# Patient Record
Sex: FEMALE | Race: WHITE | NOT HISPANIC OR LATINO | URBAN - METROPOLITAN AREA
[De-identification: names, ages, dates, MRNs, and addresses within clinical notes are randomized per-mention and may not be internally consistent; named-entity substitution may affect disease eponyms.]

---

## 2024-02-04 ENCOUNTER — INPATIENT (INPATIENT)
Facility: HOSPITAL | Age: 89
LOS: 1 days | Discharge: HOME | DRG: 66 | End: 2024-02-06
Attending: STUDENT IN AN ORGANIZED HEALTH CARE EDUCATION/TRAINING PROGRAM | Admitting: STUDENT IN AN ORGANIZED HEALTH CARE EDUCATION/TRAINING PROGRAM
Payer: MEDICARE

## 2024-02-04 VITALS
WEIGHT: 100.09 LBS | TEMPERATURE: 98 F | SYSTOLIC BLOOD PRESSURE: 135 MMHG | HEIGHT: 60 IN | OXYGEN SATURATION: 93 % | HEART RATE: 83 BPM | DIASTOLIC BLOOD PRESSURE: 79 MMHG | RESPIRATION RATE: 18 BRPM

## 2024-02-04 DIAGNOSIS — R41.0 DISORIENTATION, UNSPECIFIED: ICD-10-CM

## 2024-02-04 LAB
ALBUMIN SERPL ELPH-MCNC: 3.8 G/DL — SIGNIFICANT CHANGE UP (ref 3.5–5.2)
ALP SERPL-CCNC: 62 U/L — SIGNIFICANT CHANGE UP (ref 30–115)
ALT FLD-CCNC: 12 U/L — SIGNIFICANT CHANGE UP (ref 0–41)
ANION GAP SERPL CALC-SCNC: 11 MMOL/L — SIGNIFICANT CHANGE UP (ref 7–14)
APPEARANCE UR: CLEAR — SIGNIFICANT CHANGE UP
APTT BLD: 28.5 SEC — SIGNIFICANT CHANGE UP (ref 27–39.2)
AST SERPL-CCNC: 15 U/L — SIGNIFICANT CHANGE UP (ref 0–41)
BASOPHILS # BLD AUTO: 0.06 K/UL — SIGNIFICANT CHANGE UP (ref 0–0.2)
BASOPHILS NFR BLD AUTO: 1 % — SIGNIFICANT CHANGE UP (ref 0–1)
BILIRUB SERPL-MCNC: 0.2 MG/DL — SIGNIFICANT CHANGE UP (ref 0.2–1.2)
BILIRUB UR-MCNC: NEGATIVE — SIGNIFICANT CHANGE UP
BUN SERPL-MCNC: 25 MG/DL — HIGH (ref 10–20)
CALCIUM SERPL-MCNC: 9.2 MG/DL — SIGNIFICANT CHANGE UP (ref 8.4–10.5)
CHLORIDE SERPL-SCNC: 109 MMOL/L — SIGNIFICANT CHANGE UP (ref 98–110)
CK MB CFR SERPL CALC: 2.1 NG/ML — SIGNIFICANT CHANGE UP (ref 0.6–6.3)
CO2 SERPL-SCNC: 24 MMOL/L — SIGNIFICANT CHANGE UP (ref 17–32)
COLOR SPEC: YELLOW — SIGNIFICANT CHANGE UP
CREAT SERPL-MCNC: 0.8 MG/DL — SIGNIFICANT CHANGE UP (ref 0.7–1.5)
DIFF PNL FLD: NEGATIVE — SIGNIFICANT CHANGE UP
EGFR: 67 ML/MIN/1.73M2 — SIGNIFICANT CHANGE UP
EOSINOPHIL # BLD AUTO: 0.19 K/UL — SIGNIFICANT CHANGE UP (ref 0–0.7)
EOSINOPHIL NFR BLD AUTO: 3.1 % — SIGNIFICANT CHANGE UP (ref 0–8)
GAS PNL BLDV: SIGNIFICANT CHANGE UP
GLUCOSE SERPL-MCNC: 95 MG/DL — SIGNIFICANT CHANGE UP (ref 70–99)
GLUCOSE UR QL: NEGATIVE MG/DL — SIGNIFICANT CHANGE UP
HCT VFR BLD CALC: 31.2 % — LOW (ref 37–47)
HGB BLD-MCNC: 8.8 G/DL — LOW (ref 12–16)
IMM GRANULOCYTES NFR BLD AUTO: 0.5 % — HIGH (ref 0.1–0.3)
INR BLD: 0.97 RATIO — SIGNIFICANT CHANGE UP (ref 0.65–1.3)
KETONES UR-MCNC: NEGATIVE MG/DL — SIGNIFICANT CHANGE UP
LEUKOCYTE ESTERASE UR-ACNC: ABNORMAL
LYMPHOCYTES # BLD AUTO: 1.31 K/UL — SIGNIFICANT CHANGE UP (ref 1.2–3.4)
LYMPHOCYTES # BLD AUTO: 21.1 % — SIGNIFICANT CHANGE UP (ref 20.5–51.1)
MAGNESIUM SERPL-MCNC: 2.1 MG/DL — SIGNIFICANT CHANGE UP (ref 1.8–2.4)
MCHC RBC-ENTMCNC: 21.5 PG — LOW (ref 27–31)
MCHC RBC-ENTMCNC: 28.2 G/DL — LOW (ref 32–37)
MCV RBC AUTO: 76.1 FL — LOW (ref 81–99)
MONOCYTES # BLD AUTO: 0.37 K/UL — SIGNIFICANT CHANGE UP (ref 0.1–0.6)
MONOCYTES NFR BLD AUTO: 5.9 % — SIGNIFICANT CHANGE UP (ref 1.7–9.3)
NEUTROPHILS # BLD AUTO: 4.26 K/UL — SIGNIFICANT CHANGE UP (ref 1.4–6.5)
NEUTROPHILS NFR BLD AUTO: 68.4 % — SIGNIFICANT CHANGE UP (ref 42.2–75.2)
NITRITE UR-MCNC: NEGATIVE — SIGNIFICANT CHANGE UP
NRBC # BLD: 0 /100 WBCS — SIGNIFICANT CHANGE UP (ref 0–0)
PH UR: 5.5 — SIGNIFICANT CHANGE UP (ref 5–8)
PLATELET # BLD AUTO: 360 K/UL — SIGNIFICANT CHANGE UP (ref 130–400)
PMV BLD: 9.5 FL — SIGNIFICANT CHANGE UP (ref 7.4–10.4)
POTASSIUM SERPL-MCNC: 4.3 MMOL/L — SIGNIFICANT CHANGE UP (ref 3.5–5)
POTASSIUM SERPL-SCNC: 4.3 MMOL/L — SIGNIFICANT CHANGE UP (ref 3.5–5)
PROT SERPL-MCNC: 6.2 G/DL — SIGNIFICANT CHANGE UP (ref 6–8)
PROT UR-MCNC: SIGNIFICANT CHANGE UP MG/DL
PROTHROM AB SERPL-ACNC: 11.1 SEC — SIGNIFICANT CHANGE UP (ref 9.95–12.87)
RBC # BLD: 4.1 M/UL — LOW (ref 4.2–5.4)
RBC # FLD: 17.5 % — HIGH (ref 11.5–14.5)
SODIUM SERPL-SCNC: 144 MMOL/L — SIGNIFICANT CHANGE UP (ref 135–146)
SP GR SPEC: 1.02 — SIGNIFICANT CHANGE UP (ref 1–1.03)
TROPONIN T, HIGH SENSITIVITY RESULT: 36 NG/L — HIGH (ref 6–13)
TROPONIN T, HIGH SENSITIVITY RESULT: 37 NG/L — HIGH (ref 6–13)
UROBILINOGEN FLD QL: 0.2 MG/DL — SIGNIFICANT CHANGE UP (ref 0.2–1)
WBC # BLD: 6.22 K/UL — SIGNIFICANT CHANGE UP (ref 4.8–10.8)
WBC # FLD AUTO: 6.22 K/UL — SIGNIFICANT CHANGE UP (ref 4.8–10.8)

## 2024-02-04 PROCEDURE — 70450 CT HEAD/BRAIN W/O DYE: CPT | Mod: 26,MA

## 2024-02-04 PROCEDURE — 80053 COMPREHEN METABOLIC PANEL: CPT

## 2024-02-04 PROCEDURE — 83735 ASSAY OF MAGNESIUM: CPT

## 2024-02-04 PROCEDURE — 82728 ASSAY OF FERRITIN: CPT

## 2024-02-04 PROCEDURE — 85730 THROMBOPLASTIN TIME PARTIAL: CPT

## 2024-02-04 PROCEDURE — 82746 ASSAY OF FOLIC ACID SERUM: CPT

## 2024-02-04 PROCEDURE — 92507 TX SP LANG VOICE COMM INDIV: CPT | Mod: GN

## 2024-02-04 PROCEDURE — 99285 EMERGENCY DEPT VISIT HI MDM: CPT | Mod: FS

## 2024-02-04 PROCEDURE — 93306 TTE W/DOPPLER COMPLETE: CPT

## 2024-02-04 PROCEDURE — A9579: CPT

## 2024-02-04 PROCEDURE — 86900 BLOOD TYPING SEROLOGIC ABO: CPT

## 2024-02-04 PROCEDURE — 84443 ASSAY THYROID STIM HORMONE: CPT

## 2024-02-04 PROCEDURE — 83550 IRON BINDING TEST: CPT

## 2024-02-04 PROCEDURE — 83540 ASSAY OF IRON: CPT

## 2024-02-04 PROCEDURE — 80061 LIPID PANEL: CPT

## 2024-02-04 PROCEDURE — 82553 CREATINE MB FRACTION: CPT

## 2024-02-04 PROCEDURE — 71045 X-RAY EXAM CHEST 1 VIEW: CPT | Mod: 26

## 2024-02-04 PROCEDURE — 85610 PROTHROMBIN TIME: CPT

## 2024-02-04 PROCEDURE — 83036 HEMOGLOBIN GLYCOSYLATED A1C: CPT

## 2024-02-04 PROCEDURE — 84484 ASSAY OF TROPONIN QUANT: CPT

## 2024-02-04 PROCEDURE — 86901 BLOOD TYPING SEROLOGIC RH(D): CPT

## 2024-02-04 PROCEDURE — 36415 COLL VENOUS BLD VENIPUNCTURE: CPT

## 2024-02-04 PROCEDURE — 93010 ELECTROCARDIOGRAM REPORT: CPT | Mod: 77

## 2024-02-04 PROCEDURE — 85025 COMPLETE CBC W/AUTO DIFF WBC: CPT

## 2024-02-04 PROCEDURE — 82607 VITAMIN B-12: CPT

## 2024-02-04 PROCEDURE — 70496 CT ANGIOGRAPHY HEAD: CPT

## 2024-02-04 PROCEDURE — 97162 PT EVAL MOD COMPLEX 30 MIN: CPT | Mod: GP

## 2024-02-04 PROCEDURE — 92610 EVALUATE SWALLOWING FUNCTION: CPT | Mod: GN

## 2024-02-04 PROCEDURE — 70553 MRI BRAIN STEM W/O & W/DYE: CPT

## 2024-02-04 PROCEDURE — 93970 EXTREMITY STUDY: CPT

## 2024-02-04 PROCEDURE — 99223 1ST HOSP IP/OBS HIGH 75: CPT

## 2024-02-04 PROCEDURE — 86850 RBC ANTIBODY SCREEN: CPT

## 2024-02-04 PROCEDURE — 70498 CT ANGIOGRAPHY NECK: CPT

## 2024-02-04 RX ORDER — METOPROLOL TARTRATE 50 MG
25 TABLET ORAL DAILY
Refills: 0 | Status: DISCONTINUED | OUTPATIENT
Start: 2024-02-04 | End: 2024-02-06

## 2024-02-04 RX ORDER — ENOXAPARIN SODIUM 100 MG/ML
40 INJECTION SUBCUTANEOUS EVERY 24 HOURS
Refills: 0 | Status: DISCONTINUED | OUTPATIENT
Start: 2024-02-04 | End: 2024-02-04

## 2024-02-04 RX ORDER — METOPROLOL TARTRATE 50 MG
1 TABLET ORAL
Refills: 0 | DISCHARGE

## 2024-02-04 RX ORDER — MULTIVIT-MIN/FERROUS GLUCONATE 9 MG/15 ML
1 LIQUID (ML) ORAL DAILY
Refills: 0 | Status: DISCONTINUED | OUTPATIENT
Start: 2024-02-04 | End: 2024-02-06

## 2024-02-04 RX ORDER — ENOXAPARIN SODIUM 100 MG/ML
30 INJECTION SUBCUTANEOUS EVERY 24 HOURS
Refills: 0 | Status: DISCONTINUED | OUTPATIENT
Start: 2024-02-04 | End: 2024-02-06

## 2024-02-04 RX ORDER — LANOLIN ALCOHOL/MO/W.PET/CERES
3 CREAM (GRAM) TOPICAL AT BEDTIME
Refills: 0 | Status: DISCONTINUED | OUTPATIENT
Start: 2024-02-04 | End: 2024-02-06

## 2024-02-04 RX ORDER — ONDANSETRON 8 MG/1
4 TABLET, FILM COATED ORAL EVERY 8 HOURS
Refills: 0 | Status: DISCONTINUED | OUTPATIENT
Start: 2024-02-04 | End: 2024-02-06

## 2024-02-04 RX ORDER — MULTIVIT-MIN/FERROUS GLUCONATE 9 MG/15 ML
1 LIQUID (ML) ORAL
Refills: 0 | DISCHARGE

## 2024-02-04 RX ORDER — SODIUM CHLORIDE 9 MG/ML
1000 INJECTION INTRAMUSCULAR; INTRAVENOUS; SUBCUTANEOUS
Refills: 0 | Status: DISCONTINUED | OUTPATIENT
Start: 2024-02-04 | End: 2024-02-06

## 2024-02-04 RX ORDER — SODIUM CHLORIDE 9 MG/ML
500 INJECTION INTRAMUSCULAR; INTRAVENOUS; SUBCUTANEOUS ONCE
Refills: 0 | Status: COMPLETED | OUTPATIENT
Start: 2024-02-04 | End: 2024-02-04

## 2024-02-04 RX ORDER — AMLODIPINE BESYLATE 2.5 MG/1
1 TABLET ORAL
Refills: 0 | DISCHARGE

## 2024-02-04 RX ORDER — ACETAMINOPHEN 500 MG
650 TABLET ORAL EVERY 6 HOURS
Refills: 0 | Status: DISCONTINUED | OUTPATIENT
Start: 2024-02-04 | End: 2024-02-06

## 2024-02-04 RX ORDER — AMLODIPINE BESYLATE 2.5 MG/1
2.5 TABLET ORAL DAILY
Refills: 0 | Status: DISCONTINUED | OUTPATIENT
Start: 2024-02-04 | End: 2024-02-06

## 2024-02-04 RX ADMIN — SODIUM CHLORIDE 1000 MILLILITER(S): 9 INJECTION INTRAMUSCULAR; INTRAVENOUS; SUBCUTANEOUS at 16:59

## 2024-02-04 NOTE — ED ADULT NURSE NOTE - OBJECTIVE STATEMENT
Patient presents to ED for c/o incontinence and generalized weakness x2 days. Patient A&O x2. As per son, patient A&O x4 at baseline. Patient's son reports patient has recurrent UTI's.

## 2024-02-04 NOTE — ED PROVIDER NOTE - CLINICAL SUMMARY MEDICAL DECISION MAKING FREE TEXT BOX
Patient presented with worsening generalized weakness and altered mental status per son.  Patient has been on the functional decline over the past few months, worsening the past few days and is status post several falls.  During these falls, patient is not hitting her head and the son is catching her prior to falling.  Otherwise on arrival to ED, patient afebrile, hemodynamically stable, grossly neurovascularly intact, no evidence of trauma on exam.  EKG was obtained and showed nonspecific ST abnormalities, but no evidence of STEMI.  Obtained labs which showed mild anemia to 8.8, although no bleeding on exam, and otherwise were grossly unremarkable including no significant leukocytosis, signs of dehydration/CHARY, transaminitis or significant electrolyte abnormalities.  UA negative for infection.  Chest x-ray showed bilateral opacities, but no focal consolidations to suggest pneumonia.  CT of the head revealed a subacute 1 cm hypodensity in the right occipital cortex, but no intracranial hemorrhage or any other emergent pathologies.  Neurology was consulted and will follow, no emergent intervention at this time.  Given the above however, patient require admission for further management.  Son at bedside agreeable with plan.  Hemodynamically stable at time of admission.

## 2024-02-04 NOTE — ED PROVIDER NOTE - CONSIDERATION OF ADMISSION OBSERVATION
Patient with frequent falls, generalized weakness, worsening mental status compared to baseline, unable to be cared for at home, will require admission. Consideration of Admission/Observation

## 2024-02-04 NOTE — ED PROVIDER NOTE - PHYSICAL EXAMINATION
CONSTITUTIONAL: in no apparent distress.   HEAD: Normocephalic; atraumatic.   EYES: Pupils are round and reactive, extra-ocular muscles are intact. Eyelids are normal in appearance without swelling or lesions.   RESPIRATORY: No signs of respiratory distress. Lung sounds are clear in all lobes bilaterally without rales, rhonchi, or wheezes.  CARDIOVASCULAR: Regular rate and rhythm.   GI: Abdomen is soft, non-tender, and without distention. Bowel sounds are present and normoactive in all four quadrants. No masses are noted.   NEURO: A & O x 3. Normal speech. No focal deficit.  PSYCHOLOGICAL: Appropriate mood and affect. Good judgement and insight.

## 2024-02-04 NOTE — ED PROVIDER NOTE - PROGRESS NOTE DETAILS
Patient is admitted for confusion. Spoke with neurology over the phone regarding to the CT finding and neurology team will follow the patient after patient is admitted. Pt is aware of the plan and agrees. Pt has been endorsed to MAR.

## 2024-02-04 NOTE — H&P ADULT - ASSESSMENT
89 y/o F PMHx HTN, CVA no residual defects not on medications, asymmetric pupils, was brought in by son for evaluation. Admitted to medicine    #CTH Subacute vs chronic infarct  #Hx CVA  - No focal deficits, not on antiplatelets or statin  - Neurology eval    #Weakness  - No weakness on physical exam, no AMS, pt AAOx3, son reports she has been having trouble standing up - orthostatic?  - FU Orthostatics  - FU repeat trops  - IVF for 12 hrs   - PT consult    #HTN  - C/w home meds    # Misc  - DVT Prophylaxis: enoxaparin  - GI Prophylaxis: Not needed  - Diet: Diet, DASH/TLC  - Dispo: Acute

## 2024-02-04 NOTE — ED PROVIDER NOTE - OBJECTIVE STATEMENT
89-year-old female with past medical history of hypertension who was brought in by son for evaluation.  Per son, patient has been confused intermittently for the past 2 days, as patient would ask repeatedly what the date is.  Also endorses general weakness and almost fell multiple times at home.  States that patient has been treated for UTI multiple times for the past several months.  Denies fever, shortness breath, chest pain, nausea, vomiting, abdominal pain, and urinary symptoms.

## 2024-02-04 NOTE — H&P ADULT - HISTORY OF PRESENT ILLNESS
89 y/o F PMHx HTN, CVA no residual defects not on medications, asymmetric pupils, was brought in by son for evaluation.  Per son, patient has been confused intermittently for the past 2 days, as patient would ask repeatedly what the date is.  Also endorses general weakness and almost fell multiple times at home.  States that patient has been treated for UTI multiple times for the past several months.  Denies fever, shortness breath, chest pain, nausea, vomiting, abdominal pain, and urinary symptoms.    Vitals: /79 HR 83 RR 18 T98F sat 93% on RA    UA neg, Hgb 8.8 Trop 37    CTH No Cont  1.  About 1 cm hypodensity associated with the right occipital cortex and adjacent white matter, may reflect subacute to chronic infarction.    2.  Otherwise unremarkable study.      Admitted for work up

## 2024-02-04 NOTE — ED PROVIDER NOTE - DIFFERENTIAL DIAGNOSIS
AMS r/o infection vs intracranial pathology vs electrolyte abnormality vs dehydration vs anemia vs other metabolic derangement. Differential Diagnosis

## 2024-02-04 NOTE — H&P ADULT - ATTENDING COMMENTS
Patient seen and examined at bedside independently of the residents. I read the resident's note and agree with the plan with the additions and corrections as noted below. My note supersedes the resident's note.     REVIEW OF SYSTEMS:  negative except in HPI.     PMH:  HTN, CVA (no residual deficit), Asymmetric pupils    FHx: Reviewed. No fhx of asthma/copd, No fhx of liver and pulmonary disease. No fhx of hematological disorder.     Physical Exam:  GEN: No acute distress. Awake, Alert and oriented x 3.   Head: Atraumatic, Normocephalic.   Eye: PEERLA. No sclera icterus. EOMI. Asymmetric pupils.   ENT: Normal oropharynx, no thyromegaly, no mass, no lymphadenopathy.   LUNGS: Clear to auscultation bilaterally. No wheeze/rales/crackles.   HEART: Normal. S1/S2 present. RRR. No murmur/gallops.   ABD: Soft, non-tender, non-distended. Bowel sounds present.   EXT: LLE 1+ pitting edema. No erythema. No tenderness.  Integumentary: No rash, No sore, No petechia. + hard non tender right breast mass.   NEURO: CN III-XII intact. Strength: 5/5 b/l ULE. Sensory intact b/l ULE.     Vital Signs Last 24 Hrs  T(C): 36.8 (2024 00:58), Max: 36.8 (2024 14:29)  T(F): 98.3 (2024 00:58), Max: 98.3 (2024 00:58)  HR: 95 (2024 00:58) (79 - 95)  BP: 174/79 (2024 00:58) (135/79 - 174/79)  BP(mean): --  RR: 18 (2024 00:30) (18 - 18)  SpO2: 99% (2024 00:30) (93% - 99%)    Parameters below as of 2024 00:30  Patient On (Oxygen Delivery Method): room air      Please see the above notes for Labs and radiology.     Assessment and Plan:     89 yo F with hx of HTN, CVA (no residual deficit), Asymmetric pupils, brought in by Son for intermittent disorientation.     Intermittent confusion 2/2 CVA vs. brain met  - Currently patient is A & O x 3.   - CTH: About 1 cm hypodensity associated with the right occipital cortex and adjacent white matter, may reflect subacute to chronic infarction.  - will get MRI w/wo contrast   - check TSH, B12, folate   - check HbA1c and Lipid panel  - F/u Neurology.     Right breast mass   - patient reports that she started to notice right breast mass about 2-3 months ago and is getting bigger. Denied fever/pain/tenderness.   - Will get breast US.     Mild LLE swelling  - check duplex LE     HTN - c/w home med.    DVT ppx: Lovenox SC  GI ppx: not indicated.   Diet: DASH diet  Activity: as tolerated.      Date seen by the attendin2024  Total time spent: 75 minutes. Patient seen and examined at bedside independently of the residents. I read the resident's note and agree with the plan with the additions and corrections as noted below. My note supersedes the resident's note.     REVIEW OF SYSTEMS:  negative except in HPI.     PMH:  HTN, CVA (no residual deficit), Asymmetric pupils    FHx: Reviewed. No fhx of asthma/copd, No fhx of liver and pulmonary disease. No fhx of hematological disorder.     Physical Exam:  GEN: No acute distress. Awake, Alert and oriented x 3.   Head: Atraumatic, Normocephalic.   Eye: PEERLA. No sclera icterus. EOMI. Asymmetric pupils.   ENT: Normal oropharynx, no thyromegaly, no mass, no lymphadenopathy.   LUNGS: Clear to auscultation bilaterally. No wheeze/rales/crackles.   HEART: Normal. S1/S2 present. RRR. No murmur/gallops.   ABD: Soft, non-tender, non-distended. Bowel sounds present.   EXT: LLE 1+ pitting edema. No erythema. No tenderness.  Integumentary: No rash, No sore, No petechia. + hard non tender right breast mass.   NEURO: CN III-XII intact. Strength: 5/5 b/l ULE. Sensory intact b/l ULE.     Vital Signs Last 24 Hrs  T(C): 36.8 (2024 00:58), Max: 36.8 (2024 14:29)  T(F): 98.3 (2024 00:58), Max: 98.3 (2024 00:58)  HR: 95 (2024 00:58) (79 - 95)  BP: 174/79 (2024 00:58) (135/79 - 174/79)  BP(mean): --  RR: 18 (2024 00:30) (18 - 18)  SpO2: 99% (2024 00:30) (93% - 99%)    Parameters below as of 2024 00:30  Patient On (Oxygen Delivery Method): room air      Please see the above notes for Labs and radiology.     Assessment and Plan:     89 yo F with hx of HTN, CVA (no residual deficit), Asymmetric pupils, brought in by Son for intermittent disorientation.     Intermittent confusion 2/2 CVA vs. brain met  - Currently patient is A & O x 3.   - CTH: About 1 cm hypodensity associated with the right occipital cortex and adjacent white matter, may reflect subacute to chronic infarction.  - will get MRI w/wo contrast   - check TSH, B12, folate   - check HbA1c and Lipid panel  - F/u Neurology.     Right breast mass   - patient reports that she started to notice right breast mass about 2-3 months ago and is getting bigger. Denied fever/pain/tenderness.   - Will get breast US     Mild LLE swelling  - check duplex LE     HTN - c/w home med.    DVT ppx: Lovenox SC  GI ppx: not indicated.   Diet: DASH diet  Activity: as tolerated.      Date seen by the attendin2024  Total time spent: 75 minutes. Patient seen and examined at bedside independently of the residents. I read the resident's note and agree with the plan with the additions and corrections as noted below. My note supersedes the resident's note.     REVIEW OF SYSTEMS:  negative except in HPI.     PMH:  HTN, CVA (no residual deficit), Asymmetric pupils    FHx: Reviewed. No fhx of asthma/copd, No fhx of liver and pulmonary disease. No fhx of hematological disorder.     Physical Exam:  GEN: No acute distress. Awake, Alert and oriented x 3.   Head: Atraumatic, Normocephalic.   Eye: PEERLA. No sclera icterus. EOMI. Asymmetric pupils.   ENT: Normal oropharynx, no thyromegaly, no mass, no lymphadenopathy.   LUNGS: Clear to auscultation bilaterally. No wheeze/rales/crackles.   HEART: Normal. S1/S2 present. RRR. No murmur/gallops.   ABD: Soft, non-tender, non-distended. Bowel sounds present.   EXT: LLE 1+ pitting edema. No erythema. No tenderness.  Integumentary: No rash, No sore, No petechia. + hard non tender right breast mass.   NEURO: CN III-XII intact. Strength: 5/5 b/l ULE. Sensory intact b/l ULE.     Vital Signs Last 24 Hrs  T(C): 36.8 (2024 00:58), Max: 36.8 (2024 14:29)  T(F): 98.3 (2024 00:58), Max: 98.3 (2024 00:58)  HR: 95 (2024 00:58) (79 - 95)  BP: 174/79 (2024 00:58) (135/79 - 174/79)  BP(mean): --  RR: 18 (2024 00:30) (18 - 18)  SpO2: 99% (2024 00:30) (93% - 99%)    Parameters below as of 2024 00:30  Patient On (Oxygen Delivery Method): room air      Please see the above notes for Labs and radiology.     Assessment and Plan:     89 yo F with hx of HTN, CVA (no residual deficit), Asymmetric pupils, brought in by Son for intermittent disorientation.     Intermittent confusion 2/2 CVA vs. brain met  - Currently patient is A & O x 3.   - CTH: About 1 cm hypodensity associated with the right occipital cortex and adjacent white matter, may reflect subacute to chronic infarction.  - will get MRI w/wo contrast   - check TSH, B12, folate   - check HbA1c and Lipid panel  - F/u Neurology.     Right breast mass   - patient reports that she started to notice right breast mass about 2-3 months ago and is getting bigger. Denied fever/pain/tenderness.   - Will get breast US     Mild LLE swelling  - check duplex LE     Suspected Dysphagia/Aspiration   - patient reports coughing when drink water.   - will get speech and swallow eval.     HTN - c/w home med.    DVT ppx: Lovenox SC  GI ppx: not indicated.   Diet: DASH diet  Activity: as tolerated.      Date seen by the attendin2024  Total time spent: 75 minutes.

## 2024-02-04 NOTE — ED ADULT NURSE NOTE - NSFALLHARMRISKINTERV_ED_ALL_ED

## 2024-02-04 NOTE — H&P ADULT - NSHPPHYSICALEXAM_GEN_ALL_CORE
LOS:     VITALS:   T(C): 36.8 (02-04-24 @ 14:29), Max: 36.8 (02-04-24 @ 14:29)  HR: 83 (02-04-24 @ 14:29) (83 - 83)  BP: 135/79 (02-04-24 @ 14:29) (135/79 - 135/79)  RR: 18 (02-04-24 @ 14:29) (18 - 18)  SpO2: 93% (02-04-24 @ 14:29) (93% - 93%)    GENERAL: NAD, lying in bed comfortably  HEENT:  Atraumatic, Normocephalic  CHEST/LUNG: Clear to auscultation bilaterally. Unlabored respirations  HEART: Regular rate and rhythm  ABDOMEN: BSx4; Soft, nontender, nondistended  NERVOUS SYSTEM:  A&Ox3

## 2024-02-05 LAB
A1C WITH ESTIMATED AVERAGE GLUCOSE RESULT: 5.7 % — HIGH (ref 4–5.6)
ALBUMIN SERPL ELPH-MCNC: 3.6 G/DL — SIGNIFICANT CHANGE UP (ref 3.5–5.2)
ALP SERPL-CCNC: 57 U/L — SIGNIFICANT CHANGE UP (ref 30–115)
ALT FLD-CCNC: 10 U/L — SIGNIFICANT CHANGE UP (ref 0–41)
ANION GAP SERPL CALC-SCNC: 10 MMOL/L — SIGNIFICANT CHANGE UP (ref 7–14)
APTT BLD: 27.8 SEC — SIGNIFICANT CHANGE UP (ref 27–39.2)
AST SERPL-CCNC: 15 U/L — SIGNIFICANT CHANGE UP (ref 0–41)
BASOPHILS # BLD AUTO: 0.05 K/UL — SIGNIFICANT CHANGE UP (ref 0–0.2)
BASOPHILS NFR BLD AUTO: 0.7 % — SIGNIFICANT CHANGE UP (ref 0–1)
BILIRUB SERPL-MCNC: 0.3 MG/DL — SIGNIFICANT CHANGE UP (ref 0.2–1.2)
BUN SERPL-MCNC: 23 MG/DL — HIGH (ref 10–20)
CALCIUM SERPL-MCNC: 8.8 MG/DL — SIGNIFICANT CHANGE UP (ref 8.4–10.4)
CHLORIDE SERPL-SCNC: 108 MMOL/L — SIGNIFICANT CHANGE UP (ref 98–110)
CHOLEST SERPL-MCNC: 150 MG/DL — SIGNIFICANT CHANGE UP
CO2 SERPL-SCNC: 25 MMOL/L — SIGNIFICANT CHANGE UP (ref 17–32)
CREAT SERPL-MCNC: 0.7 MG/DL — SIGNIFICANT CHANGE UP (ref 0.7–1.5)
CULTURE RESULTS: SIGNIFICANT CHANGE UP
EGFR: 79 ML/MIN/1.73M2 — SIGNIFICANT CHANGE UP
EOSINOPHIL # BLD AUTO: 0.17 K/UL — SIGNIFICANT CHANGE UP (ref 0–0.7)
EOSINOPHIL NFR BLD AUTO: 2.3 % — SIGNIFICANT CHANGE UP (ref 0–8)
ESTIMATED AVERAGE GLUCOSE: 117 MG/DL — HIGH (ref 68–114)
FERRITIN SERPL-MCNC: 16 NG/ML — SIGNIFICANT CHANGE UP (ref 13–330)
FOLATE SERPL-MCNC: 16.3 NG/ML — SIGNIFICANT CHANGE UP
GLUCOSE SERPL-MCNC: 86 MG/DL — SIGNIFICANT CHANGE UP (ref 70–99)
HCT VFR BLD CALC: 28.8 % — LOW (ref 37–47)
HDLC SERPL-MCNC: 63 MG/DL — SIGNIFICANT CHANGE UP
HGB BLD-MCNC: 8.1 G/DL — LOW (ref 12–16)
IMM GRANULOCYTES NFR BLD AUTO: 0.3 % — SIGNIFICANT CHANGE UP (ref 0.1–0.3)
INR BLD: 1 RATIO — SIGNIFICANT CHANGE UP (ref 0.65–1.3)
IRON SATN MFR SERPL: 25 UG/DL — LOW (ref 35–150)
IRON SATN MFR SERPL: 9 % — LOW (ref 15–50)
LIPID PNL WITH DIRECT LDL SERPL: 76 MG/DL — SIGNIFICANT CHANGE UP
LYMPHOCYTES # BLD AUTO: 1.37 K/UL — SIGNIFICANT CHANGE UP (ref 1.2–3.4)
LYMPHOCYTES # BLD AUTO: 18.9 % — LOW (ref 20.5–51.1)
MAGNESIUM SERPL-MCNC: 1.9 MG/DL — SIGNIFICANT CHANGE UP (ref 1.8–2.4)
MCHC RBC-ENTMCNC: 21.5 PG — LOW (ref 27–31)
MCHC RBC-ENTMCNC: 28.1 G/DL — LOW (ref 32–37)
MCV RBC AUTO: 76.4 FL — LOW (ref 81–99)
MONOCYTES # BLD AUTO: 0.4 K/UL — SIGNIFICANT CHANGE UP (ref 0.1–0.6)
MONOCYTES NFR BLD AUTO: 5.5 % — SIGNIFICANT CHANGE UP (ref 1.7–9.3)
NEUTROPHILS # BLD AUTO: 5.23 K/UL — SIGNIFICANT CHANGE UP (ref 1.4–6.5)
NEUTROPHILS NFR BLD AUTO: 72.3 % — SIGNIFICANT CHANGE UP (ref 42.2–75.2)
NON HDL CHOLESTEROL: 87 MG/DL — SIGNIFICANT CHANGE UP
NRBC # BLD: 0 /100 WBCS — SIGNIFICANT CHANGE UP (ref 0–0)
PLATELET # BLD AUTO: 357 K/UL — SIGNIFICANT CHANGE UP (ref 130–400)
PMV BLD: 9.7 FL — SIGNIFICANT CHANGE UP (ref 7.4–10.4)
POTASSIUM SERPL-MCNC: 4.1 MMOL/L — SIGNIFICANT CHANGE UP (ref 3.5–5)
POTASSIUM SERPL-SCNC: 4.1 MMOL/L — SIGNIFICANT CHANGE UP (ref 3.5–5)
PROT SERPL-MCNC: 5.8 G/DL — LOW (ref 6–8)
PROTHROM AB SERPL-ACNC: 11.4 SEC — SIGNIFICANT CHANGE UP (ref 9.95–12.87)
RBC # BLD: 3.77 M/UL — LOW (ref 4.2–5.4)
RBC # FLD: 17.7 % — HIGH (ref 11.5–14.5)
SODIUM SERPL-SCNC: 143 MMOL/L — SIGNIFICANT CHANGE UP (ref 135–146)
SPECIMEN SOURCE: SIGNIFICANT CHANGE UP
TIBC SERPL-MCNC: 264 UG/DL — SIGNIFICANT CHANGE UP (ref 220–430)
TRIGL SERPL-MCNC: 53 MG/DL — SIGNIFICANT CHANGE UP
TSH SERPL-MCNC: 3.63 UIU/ML — SIGNIFICANT CHANGE UP (ref 0.27–4.2)
UIBC SERPL-MCNC: 239 UG/DL — SIGNIFICANT CHANGE UP (ref 110–370)
VIT B12 SERPL-MCNC: 719 PG/ML — SIGNIFICANT CHANGE UP (ref 232–1245)
WBC # BLD: 7.24 K/UL — SIGNIFICANT CHANGE UP (ref 4.8–10.8)
WBC # FLD AUTO: 7.24 K/UL — SIGNIFICANT CHANGE UP (ref 4.8–10.8)

## 2024-02-05 PROCEDURE — 99222 1ST HOSP IP/OBS MODERATE 55: CPT | Mod: GC

## 2024-02-05 PROCEDURE — 70496 CT ANGIOGRAPHY HEAD: CPT | Mod: 26

## 2024-02-05 PROCEDURE — 70553 MRI BRAIN STEM W/O & W/DYE: CPT | Mod: 26

## 2024-02-05 PROCEDURE — 99233 SBSQ HOSP IP/OBS HIGH 50: CPT

## 2024-02-05 PROCEDURE — 93970 EXTREMITY STUDY: CPT | Mod: 26

## 2024-02-05 PROCEDURE — 70498 CT ANGIOGRAPHY NECK: CPT | Mod: 26

## 2024-02-05 RX ORDER — ASPIRIN/CALCIUM CARB/MAGNESIUM 324 MG
81 TABLET ORAL DAILY
Refills: 0 | Status: DISCONTINUED | OUTPATIENT
Start: 2024-02-05 | End: 2024-02-06

## 2024-02-05 RX ORDER — ATORVASTATIN CALCIUM 80 MG/1
40 TABLET, FILM COATED ORAL AT BEDTIME
Refills: 0 | Status: DISCONTINUED | OUTPATIENT
Start: 2024-02-05 | End: 2024-02-06

## 2024-02-05 RX ADMIN — SODIUM CHLORIDE 50 MILLILITER(S): 9 INJECTION INTRAMUSCULAR; INTRAVENOUS; SUBCUTANEOUS at 05:35

## 2024-02-05 RX ADMIN — AMLODIPINE BESYLATE 2.5 MILLIGRAM(S): 2.5 TABLET ORAL at 05:23

## 2024-02-05 RX ADMIN — Medication 1 TABLET(S): at 15:16

## 2024-02-05 RX ADMIN — ENOXAPARIN SODIUM 30 MILLIGRAM(S): 100 INJECTION SUBCUTANEOUS at 15:21

## 2024-02-05 RX ADMIN — Medication 25 MILLIGRAM(S): at 05:23

## 2024-02-05 RX ADMIN — Medication 10 MILLIGRAM(S): at 05:22

## 2024-02-05 RX ADMIN — ATORVASTATIN CALCIUM 40 MILLIGRAM(S): 80 TABLET, FILM COATED ORAL at 22:30

## 2024-02-05 NOTE — CONSULT NOTE ADULT - SUBJECTIVE AND OBJECTIVE BOX
Neurology Consult    Patient is a 89y old  Female with PMH of HTN, stroke (about 3 years ago, with residual Rt arm ataxia), asymmetric pupils who presents with a chief complaint of Weakness (04 Feb 2024 19:28)  Patient was seen and examined at the bedside. the patient states that she was brought to the hospital by her son, after her PCP told him to do so with her being generally weak.   She denied vision loss, diplopia, speech disturbance, or vertigo. Pt denied headache , neck pain or any recent head or neck trauma. She stated that her Rt arm was abnormal since her  passed about 3y ago, and she was told that she had a stroke in another hospital.     HPI:  89 y/o F PMHx HTN, CVA no residual defects not on medications, asymmetric pupils, was brought in by son for evaluation.  Per son, patient has been confused intermittently for the past 2 days, as patient would ask repeatedly what the date is.  Also endorses general weakness and almost fell multiple times at home.  States that patient has been treated for UTI multiple times for the past several months.  Denies fever, shortness breath, chest pain, nausea, vomiting, abdominal pain, and urinary symptoms.    Vitals: /79 HR 83 RR 18 T98F sat 93% on RA    UA neg, Hgb 8.8 Trop 37    CTH No Cont  1.  About 1 cm hypodensity associated with the right occipital cortex and adjacent white matter, may reflect subacute to chronic infarction.    2.  Otherwise unremarkable study.      Admitted for work up (04 Feb 2024 19:28)      PAST MEDICAL & SURGICAL HISTORY:  HTN (hypertension)      Cerebrovascular accident (CVA)          FAMILY HISTORY:      Social History: (-) x 3    Allergies    No Known Allergies    Intolerances        MEDICATIONS  (STANDING):  amLODIPine   Tablet 2.5 milliGRAM(s) Oral daily  artificial tears (preservative free) Ophthalmic Solution 1 Drop(s) Both EYES two times a day  enalapril 10 milliGRAM(s) Oral daily  enoxaparin Injectable 30 milliGRAM(s) SubCutaneous every 24 hours  metoprolol succinate ER 25 milliGRAM(s) Oral daily  multivitamin/minerals 1 Tablet(s) Oral daily  sodium chloride 0.9%. 1000 milliLiter(s) (50 mL/Hr) IV Continuous <Continuous>    MEDICATIONS  (PRN):  acetaminophen     Tablet .. 650 milliGRAM(s) Oral every 6 hours PRN Temp greater or equal to 38C (100.4F), Mild Pain (1 - 3)  aluminum hydroxide/magnesium hydroxide/simethicone Suspension 30 milliLiter(s) Oral every 4 hours PRN Dyspepsia  melatonin 3 milliGRAM(s) Oral at bedtime PRN Insomnia  ondansetron Injectable 4 milliGRAM(s) IV Push every 8 hours PRN Nausea and/or Vomiting      Review of systems:    Constitutional: as per HPI  Eyes: No eye pain or discharge  ENMT:  No difficulty hearing; No sinus or throat pain  Neck: No pain or stiffness  Respiratory: No cough, wheezing, chills or hemoptysis  Cardiovascular: No chest pain, palpitations, shortness of breath, dyspnea on exertion  Gastrointestinal: No abdominal pain, nausea, vomiting or hematemesis; No diarrhea or constipation.   Genitourinary: No dysuria, frequency, hematuria or incontinence  Neurological: As per HPI  Skin: No rashes or lesions   Endocrine: No heat or cold intolerance; No hair loss  Musculoskeletal: No joint pain or swelling  Psychiatric: No depression, anxiety, mood swings  Heme/Lymph: No easy bruising or bleeding gums    Vital Signs Last 24 Hrs  T(C): 36.7 (05 Feb 2024 07:14), Max: 36.8 (04 Feb 2024 14:29)  T(F): 98.1 (05 Feb 2024 07:14), Max: 98.3 (05 Feb 2024 00:58)  HR: 82 (05 Feb 2024 07:14) (78 - 95)  BP: 130/65 (05 Feb 2024 07:14) (130/65 - 174/79)  BP(mean): --  RR: 18 (05 Feb 2024 07:14) (18 - 18)  SpO2: 98% (05 Feb 2024 07:14) (93% - 99%)    Parameters below as of 05 Feb 2024 07:14  Patient On (Oxygen Delivery Method): room air        Examination:  General:  Appearance is consistent with chronologic age.  No abnormal facies.  Gross skin survey within normal limits.    Cognitive/Language:  The patient is oriented to person, place, time and date (she was able to tell her full name, recognized me as a doctor, and recognized the nurse, she was able to tell her age, and the current month and year, she was able to tell she is in a hospital and the name of this hospital. Language with normal repetition, comprehension and naming.  Mild dysarthric (states it is old)  Eyes: Lt upper quadrantanopia.  EOMI w/o nystagmus, skew or reported double vision. Rt pupil, not reactive, irregular, and dilated 6-7 mm, Lt pupil reactive, regular 2-3 mm.  No ptosis/weakness of eyelid closure.    Face:  Facial sensation normal V1 - 3, no facial asymmetry.    Ears/Nose/Throat:  Hearing decreased b/l.  Palate elevates midline.  Tongue and uvula midline.   Motor examination: Normal tone, bulk and range of motion.  No tenderness, twitching, tremors or involuntary movements.  Formal Muscle Strength Testing: (MRC grade R/L) 5/5 UE; 5/5 LE.  No observable drift.  Reflexes: 1+ b/l biceps, triceps, brachioradialis, patella and absent Achilles.  Plantar response downgoing Rt, up going Lt.   Sensory examination: Intact to light touch and pinprick in all extremities.  Cerebellum: Ataxia and dysmetria on the Rt arm.    Gait deferred (she uses a walker at baseline)        Labs:   CBC Full  -  ( 05 Feb 2024 05:43 )  WBC Count : 7.24 K/uL  RBC Count : 3.77 M/uL  Hemoglobin : 8.1 g/dL  Hematocrit : 28.8 %  Platelet Count - Automated : 357 K/uL  Mean Cell Volume : 76.4 fL  Mean Cell Hemoglobin : 21.5 pg  Mean Cell Hemoglobin Concentration : 28.1 g/dL  Auto Neutrophil # : 5.23 K/uL  Auto Lymphocyte # : 1.37 K/uL  Auto Monocyte # : 0.40 K/uL  Auto Eosinophil # : 0.17 K/uL  Auto Basophil # : 0.05 K/uL  Auto Neutrophil % : 72.3 %  Auto Lymphocyte % : 18.9 %  Auto Monocyte % : 5.5 %  Auto Eosinophil % : 2.3 %  Auto Basophil % : 0.7 %    02-05    143  |  108  |  23<H>  ----------------------------<  86  4.1   |  25  |  0.7    Ca    8.8      05 Feb 2024 05:43  Mg     1.9     02-05    TPro  5.8<L>  /  Alb  3.6  /  TBili  0.3  /  DBili  x   /  AST  15  /  ALT  10  /  AlkPhos  57  02-05    LIVER FUNCTIONS - ( 05 Feb 2024 05:43 )  Alb: 3.6 g/dL / Pro: 5.8 g/dL / ALK PHOS: 57 U/L / ALT: 10 U/L / AST: 15 U/L / GGT: x           PT/INR - ( 05 Feb 2024 05:43 )   PT: 11.40 sec;   INR: 1.00 ratio         PTT - ( 05 Feb 2024 05:43 )  PTT:27.8 sec  Urinalysis Basic - ( 05 Feb 2024 05:43 )    Color: x / Appearance: x / SG: x / pH: x  Gluc: 86 mg/dL / Ketone: x  / Bili: x / Urobili: x   Blood: x / Protein: x / Nitrite: x   Leuk Esterase: x / RBC: x / WBC x   Sq Epi: x / Non Sq Epi: x / Bacteria: x          Neuroimaging:  NCHCT: CT Head No Cont:   ACC: [id number] EXAM:  CT BRAIN   ORDERED BY: JULISSA CERVANTES     PROCEDURE DATE:  02/04/2024          INTERPRETATION:  INDICATION: Confusion.    TECHNIQUE: CT of the head was performed without intravenous contrast.    Multiple contiguous axial images were acquired from the skull-base to the   vertex without the administration of intravenous contrast. Coronal and   sagittal reformations were made.    CORRELATION/COMPARISON: None    FINDINGS:    The ventricles and sulci are prominent, compatible with moderate cerebral   volume loss.    Focal calcific density noted in the region of the right sylvian fissure   cortex.    Focal hypodensity of the right occipital lobe (series 6, image 81).    There are no extra-axial fluid collections or hydrocephalus. There is no   evidence of acute intracranial hemorrhage, mass effect, or midline shift.    Partially opacified left sphenoid sinus and ethmoid air cell. The   remaining paranasal sinuses and mastoid complexes are clear. The   calvarium is intact.      IMPRESSION:    1.  About 1 cm hypodensity associated with the right occipital cortex and   adjacent white matter, may reflect subacute to chronic infarction.    2.  Otherwise unremarkable study.    --- End of Report ---          BENITA LÓPEZ MD; Resident Radiologist  This document has been electronically signed.  DARWIN THOMPSON MD; Attending Interventional Radiologist  This document has been electronically signed. Feb 4 2024  6:05PM (02-04-24 @ 16:31)      02-05-24 @ 10:03

## 2024-02-05 NOTE — SWALLOW BEDSIDE ASSESSMENT ADULT - NS SPL SWALLOW CLINIC TRIAL FT
oropharyngeal swallow is WFL for controlled sips of thin, puree and reg solids w/o overt s/s of penetration/aspiration. +min overt s/s of penetration/aspiration w/ consecutive sips of thin liquids

## 2024-02-05 NOTE — PHYSICAL THERAPY INITIAL EVALUATION ADULT - PERTINENT HX OF CURRENT PROBLEM, REHAB EVAL
89 y/o F PMHx HTN, CVA no residual defects not on medications, asymmetric pupils, was brought in by son for evaluation.  Per son, patient has been confused intermittently for the past 2 days, as patient would ask repeatedly what the date is.  Also endorses general weakness and almost fell multiple times at home.  States that patient has been treated for UTI multiple times for the past several months.  Denies fever, shortness breath, chest pain, nausea, vomiting, abdominal pain, and urinary symptoms.

## 2024-02-05 NOTE — PHYSICAL THERAPY INITIAL EVALUATION ADULT - ADDITIONAL COMMENTS
Pt. lives alone with no stairs to negotiate. Per son at bedside, pt. was ambulating functionally with rollator prior to admission and able to negotiate stairs as needed.

## 2024-02-05 NOTE — CONSULT NOTE ADULT - ATTENDING COMMENTS
Bihemispheric infarcts noted on MRI, most predominantly in the R occipital and frontal lobes and left ian. Start ASA/Plavix and high-intensity statin. Obtain vessel imaging and 2D ECHO. PT/OT/ST.

## 2024-02-05 NOTE — SWALLOW BEDSIDE ASSESSMENT ADULT - COMMENTS
Pt son reported h/o dysphagia , coughs w/ liquids at home, Was placed on "thickened liquids" in past by  at [facility], no reported instrumental swallow study. Pt did not like thickened liquids nor use of specialized metered drinking utensils (provale cup/metered drinking straw)

## 2024-02-05 NOTE — SWALLOW BEDSIDE ASSESSMENT ADULT - SLP PERTINENT HISTORY OF CURRENT PROBLEM
89 yo F with hx of HTN, CVA (no residual deficit), Asymmetric pupils, brought in by Son for intermittent disorientation and dysarthria r/o CVA. Sons states pt's mental status waxes and wanes CTH reviewed and interpreted: About 1 cm hypodensity associated with the right occipital cortex and adjacent white matter, may reflect subacute to chronic infarction

## 2024-02-05 NOTE — CONSULT NOTE ADULT - CONSULT REASON
1 cm hypodensity associated with the right occipital cortex and   adjacent white matter, may reflect subacute to chronic infarction

## 2024-02-05 NOTE — CONSULT NOTE ADULT - ASSESSMENT
This 89y old female with the above PMH, referred to neurovascular for " 1 cm hypodensity associated with the right occipital cortex and adjacent white matter, may reflect subacute to chronic infarction". CTH was reviewed and the mentioned lesion is most likely chronic and related to the patient previous stroke; however, she is not on treatment and her stroke risk factors are not evaluated      Recommendations:  - Start ASA 81mg PO daily (if not contraindications)  - Start Atorvastatin 40mg PO daily  - Check A1C  - Obtain MRI brain without  - Recommend SBP goal within normal   - Recommend q8hr stroke neuro checks  - Provide stroke education  - TTE with bubble study  - Keep on telemonitoring            Thank you for sharing this patient with me; please do not hesitate to contact me in case of any question.        This 89y old female with the above PMH, referred to neurovascular for " 1 cm hypodensity associated with the right occipital cortex and adjacent white matter, may reflect subacute to chronic infarction". CTH was reviewed and the mentioned lesion is most likely chronic and related to the patient previous stroke; however, she is not on treatment and her stroke risk factors are not evaluated      Recommendations:  - Start ASA 81mg PO daily (if not contraindications)  - Start Atorvastatin 40mg PO daily  - Check A1C  - Obtain MRI brain without  - Recommend SBP goal within normal   - Recommend q8hr stroke neuro checks  - Provide stroke education  - TTE   - CTA head and neck   - Keep on telemonitoring            Thank you for sharing this patient with me; please do not hesitate to contact me in case of any question.

## 2024-02-05 NOTE — PHYSICAL THERAPY INITIAL EVALUATION ADULT - LEVEL OF INDEPENDENCE: GAIT, REHAB EVAL
Lot # (Optional): [id number] Lot # (Optional): [id number] with occasional Min A to maintain balance/contact guard

## 2024-02-05 NOTE — PHYSICAL THERAPY INITIAL EVALUATION ADULT - GENERAL OBSERVATIONS, REHAB EVAL
14:05-14:40 Pt. encountered in semifowler in stretcher in NAD. +Weems catheter. Son at bedside. R UE dysmetria noted with finger-to-nose testing. Agreeable to PT. RN made aware.

## 2024-02-05 NOTE — PROGRESS NOTE ADULT - ASSESSMENT
89 yo F with hx of HTN, CVA (no residual deficit), Asymmetric pupils, brought in by Son for intermittent disorientation.     #Intermittent confusion   r/o CVA  Currently patient is A & O x 3. Sons states pt's mental status waxes and wanes  CTH reviewed and interpreted: About 1 cm hypodensity associated with the right occipital cortex and adjacent white matter, may reflect subacute to chronic infarction  dw neurology  - MRI w/wo contrast   - ASA/statin until imaginge  - check TSH, B12, folate   - check HbA1c and Lipid panel  - PT eval    #Right breast mass   patient reports that she started to notice right breast mass about 2-3 months ago and is getting bigger. Denied fever/pain/tenderness.   - Outpatient f/u, son informed    #Mild LLE swelling  - check duplex LE     #Suspected Dysphagia/Aspiration   - patient reports coughing when drink water.   - S/S eval pending    HTN   - metoprolol ER 25mg qD  - enalapril 10mg qD    DVT ppx: Lovenox SC    #Progress Note Handoff  Pending (specify): MR Head  Family discussion: dw son regarding eval for stroke  Disposition: Home 89 yo F with hx of HTN, CVA (no residual deficit), Asymmetric pupils, brought in by Son for intermittent disorientation.     #Intermittent confusion   #Acute/Subacute CVA  #Hx CVA  Currently patient is A & O x 3. Sons states pt's mental status waxes and wanes  CTH reviewed and interpreted: About 1 cm hypodensity associated with the right occipital cortex and adjacent white matter, may reflect subacute to chronic infarction  MR Head w/wo IV contrast showing small aculte/subacute infarct in inferior right occipital lobe wiht leptomeningeal enhancement; Additional small acute infarcts in the right frontal cortex and in the left ian  dw neurology  - Tele monitoring  - ASA/statin  - f/u Echocardiogram  - check TSH, B12, folate   - check HbA1c and Lipid panel  - PT eval    #Right breast mass   patient reports that she started to notice right breast mass about 2-3 months ago and is getting bigger. Denied fever/pain/tenderness.   - Outpatient f/u, son informed    #Mild LLE swelling  - check duplex LE     #Suspected Dysphagia/Aspiration   - patient reports coughing when drink water.   - S/S eval pending    HTN   - metoprolol ER 25mg qD  - enalapril 10mg qD    DVT ppx: Lovenox SC    #Progress Note Handoff  Pending (specify): Neurology f/u, PT Eval  Family discussion: dw son regarding MR findings  Disposition: Home

## 2024-02-06 VITALS
HEART RATE: 91 BPM | RESPIRATION RATE: 18 BRPM | SYSTOLIC BLOOD PRESSURE: 157 MMHG | TEMPERATURE: 98 F | DIASTOLIC BLOOD PRESSURE: 75 MMHG

## 2024-02-06 LAB
ALBUMIN SERPL ELPH-MCNC: 3.5 G/DL — SIGNIFICANT CHANGE UP (ref 3.5–5.2)
ALP SERPL-CCNC: 52 U/L — SIGNIFICANT CHANGE UP (ref 30–115)
ALT FLD-CCNC: 9 U/L — SIGNIFICANT CHANGE UP (ref 0–41)
ANION GAP SERPL CALC-SCNC: 6 MMOL/L — LOW (ref 7–14)
AST SERPL-CCNC: 14 U/L — SIGNIFICANT CHANGE UP (ref 0–41)
BASOPHILS # BLD AUTO: 0.09 K/UL — SIGNIFICANT CHANGE UP (ref 0–0.2)
BASOPHILS NFR BLD AUTO: 1.2 % — HIGH (ref 0–1)
BILIRUB SERPL-MCNC: 0.3 MG/DL — SIGNIFICANT CHANGE UP (ref 0.2–1.2)
BLD GP AB SCN SERPL QL: SIGNIFICANT CHANGE UP
BUN SERPL-MCNC: 29 MG/DL — HIGH (ref 10–20)
CALCIUM SERPL-MCNC: 8.9 MG/DL — SIGNIFICANT CHANGE UP (ref 8.4–10.5)
CHLORIDE SERPL-SCNC: 109 MMOL/L — SIGNIFICANT CHANGE UP (ref 98–110)
CO2 SERPL-SCNC: 27 MMOL/L — SIGNIFICANT CHANGE UP (ref 17–32)
CREAT SERPL-MCNC: 0.6 MG/DL — LOW (ref 0.7–1.5)
EGFR: 82 ML/MIN/1.73M2 — SIGNIFICANT CHANGE UP
EOSINOPHIL # BLD AUTO: 0.17 K/UL — SIGNIFICANT CHANGE UP (ref 0–0.7)
EOSINOPHIL NFR BLD AUTO: 2.2 % — SIGNIFICANT CHANGE UP (ref 0–8)
GLUCOSE SERPL-MCNC: 95 MG/DL — SIGNIFICANT CHANGE UP (ref 70–99)
HCT VFR BLD CALC: 26.2 % — LOW (ref 37–47)
HGB BLD-MCNC: 7.5 G/DL — LOW (ref 12–16)
IMM GRANULOCYTES NFR BLD AUTO: 0.3 % — SIGNIFICANT CHANGE UP (ref 0.1–0.3)
LYMPHOCYTES # BLD AUTO: 1.74 K/UL — SIGNIFICANT CHANGE UP (ref 1.2–3.4)
LYMPHOCYTES # BLD AUTO: 23 % — SIGNIFICANT CHANGE UP (ref 20.5–51.1)
MAGNESIUM SERPL-MCNC: 1.9 MG/DL — SIGNIFICANT CHANGE UP (ref 1.8–2.4)
MCHC RBC-ENTMCNC: 21.7 PG — LOW (ref 27–31)
MCHC RBC-ENTMCNC: 28.6 G/DL — LOW (ref 32–37)
MCV RBC AUTO: 75.7 FL — LOW (ref 81–99)
MONOCYTES # BLD AUTO: 0.45 K/UL — SIGNIFICANT CHANGE UP (ref 0.1–0.6)
MONOCYTES NFR BLD AUTO: 5.9 % — SIGNIFICANT CHANGE UP (ref 1.7–9.3)
NEUTROPHILS # BLD AUTO: 5.11 K/UL — SIGNIFICANT CHANGE UP (ref 1.4–6.5)
NEUTROPHILS NFR BLD AUTO: 67.4 % — SIGNIFICANT CHANGE UP (ref 42.2–75.2)
NRBC # BLD: 0 /100 WBCS — SIGNIFICANT CHANGE UP (ref 0–0)
PLATELET # BLD AUTO: 356 K/UL — SIGNIFICANT CHANGE UP (ref 130–400)
PMV BLD: 9.6 FL — SIGNIFICANT CHANGE UP (ref 7.4–10.4)
POTASSIUM SERPL-MCNC: 4.4 MMOL/L — SIGNIFICANT CHANGE UP (ref 3.5–5)
POTASSIUM SERPL-SCNC: 4.4 MMOL/L — SIGNIFICANT CHANGE UP (ref 3.5–5)
PROT SERPL-MCNC: 5.8 G/DL — LOW (ref 6–8)
RBC # BLD: 3.46 M/UL — LOW (ref 4.2–5.4)
RBC # FLD: 17.8 % — HIGH (ref 11.5–14.5)
SODIUM SERPL-SCNC: 142 MMOL/L — SIGNIFICANT CHANGE UP (ref 135–146)
WBC # BLD: 7.58 K/UL — SIGNIFICANT CHANGE UP (ref 4.8–10.8)
WBC # FLD AUTO: 7.58 K/UL — SIGNIFICANT CHANGE UP (ref 4.8–10.8)

## 2024-02-06 PROCEDURE — 99239 HOSP IP/OBS DSCHRG MGMT >30: CPT

## 2024-02-06 PROCEDURE — 93306 TTE W/DOPPLER COMPLETE: CPT | Mod: 26

## 2024-02-06 RX ORDER — ATORVASTATIN CALCIUM 80 MG/1
1 TABLET, FILM COATED ORAL
Qty: 30 | Refills: 0
Start: 2024-02-06 | End: 2024-03-06

## 2024-02-06 RX ORDER — PANTOPRAZOLE SODIUM 20 MG/1
40 TABLET, DELAYED RELEASE ORAL
Refills: 0 | Status: DISCONTINUED | OUTPATIENT
Start: 2024-02-06 | End: 2024-02-06

## 2024-02-06 RX ORDER — IRON SUCROSE 20 MG/ML
200 INJECTION, SOLUTION INTRAVENOUS ONCE
Refills: 0 | Status: COMPLETED | OUTPATIENT
Start: 2024-02-06 | End: 2024-02-06

## 2024-02-06 RX ORDER — SODIUM CHLORIDE 9 MG/ML
500 INJECTION INTRAMUSCULAR; INTRAVENOUS; SUBCUTANEOUS ONCE
Refills: 0 | Status: COMPLETED | OUTPATIENT
Start: 2024-02-06 | End: 2024-02-06

## 2024-02-06 RX ORDER — PANTOPRAZOLE SODIUM 20 MG/1
1 TABLET, DELAYED RELEASE ORAL
Qty: 0 | Refills: 0 | DISCHARGE
Start: 2024-02-06

## 2024-02-06 RX ORDER — ATORVASTATIN CALCIUM 80 MG/1
1 TABLET, FILM COATED ORAL
Qty: 0 | Refills: 0 | DISCHARGE
Start: 2024-02-06

## 2024-02-06 RX ORDER — PANTOPRAZOLE SODIUM 20 MG/1
1 TABLET, DELAYED RELEASE ORAL
Qty: 30 | Refills: 0
Start: 2024-02-06 | End: 2024-03-06

## 2024-02-06 RX ORDER — ASPIRIN/CALCIUM CARB/MAGNESIUM 324 MG
1 TABLET ORAL
Qty: 0 | Refills: 0 | DISCHARGE
Start: 2024-02-06

## 2024-02-06 RX ORDER — CLOPIDOGREL BISULFATE 75 MG/1
1 TABLET, FILM COATED ORAL
Qty: 21 | Refills: 0
Start: 2024-02-06 | End: 2024-02-26

## 2024-02-06 RX ORDER — ASPIRIN/CALCIUM CARB/MAGNESIUM 324 MG
1 TABLET ORAL
Qty: 30 | Refills: 0
Start: 2024-02-06 | End: 2024-03-06

## 2024-02-06 RX ADMIN — Medication 81 MILLIGRAM(S): at 12:00

## 2024-02-06 RX ADMIN — Medication 10 MILLIGRAM(S): at 06:30

## 2024-02-06 RX ADMIN — Medication 1 TABLET(S): at 12:00

## 2024-02-06 RX ADMIN — IRON SUCROSE 110 MILLIGRAM(S): 20 INJECTION, SOLUTION INTRAVENOUS at 11:59

## 2024-02-06 RX ADMIN — Medication 25 MILLIGRAM(S): at 06:31

## 2024-02-06 RX ADMIN — AMLODIPINE BESYLATE 2.5 MILLIGRAM(S): 2.5 TABLET ORAL at 06:30

## 2024-02-06 RX ADMIN — SODIUM CHLORIDE 1000 MILLILITER(S): 9 INJECTION INTRAMUSCULAR; INTRAVENOUS; SUBCUTANEOUS at 16:00

## 2024-02-06 NOTE — DISCHARGE NOTE PROVIDER - PROVIDER TOKENS
PROVIDER:[TOKEN:[[id number]],FOLLOWUP:[2 weeks]] PROVIDER:[TOKEN:[[id number]],FOLLOWUP:[2 weeks]],PROVIDER:[TOKEN:[[id number]],FOLLOWUP:[1 week]]

## 2024-02-06 NOTE — DISCHARGE NOTE PROVIDER - NSDCCPCAREPLAN_GEN_ALL_CORE_FT
PRINCIPAL DISCHARGE DIAGNOSIS  Diagnosis: Confusion  Assessment and Plan of Treatment: You have been diagnosed with stroke which is a condition that develops when part of the brain doesn't get enough blood and oxygen   - Take your blood thinners and cholesterol medication as prescribed. Do not skip doses and do not run low on your medication. call your doctor if you need refills   - If you have diabetes, take your diabetes medication as prescribed. Check your blood sugar level every day and contact your doctor if the levels arr high as your medication may need to be re-adjusted or some medication may need to be added   - If you have hypertension, take your blood pressure medication as prescribed. Measure your blood pressure with a cuff every day and write down the values. Call your doctors if the values are high despite medication as he may adjust your medication   - Avoid smoking and do not let anyone smoke next to you. If you are a smoker and find it hard to smoke seek help or call your doctors for referrals for counselling programs   - Follow up with your doctor as outpatient. he may prescribe regular lab work to keep track of your cholesterol and sugar levels in your blood. Do not skip appointments and always be compliant wit your doctor's advise  - Call 911 or report to the emergency department if you have sudden onset severe headache, vision problems such as blurry or double vision or vision that is going dark, vertigo, numbness or weakness anywhere in your body, slurred speech or difficulty walking        SECONDARY DISCHARGE DIAGNOSES  Diagnosis: Weakness  Assessment and Plan of Treatment:

## 2024-02-06 NOTE — PROGRESS NOTE ADULT - SUBJECTIVE AND OBJECTIVE BOX
24H events:    Patient is a 89y old Female who presents with a chief complaint of Weakness (05 Feb 2024 12:31)    Primary diagnosis of Confusion    Today is hospital day 2d. This morning patient was seen and examined at bedside, resting comfortably in bed.    No acute or major events overnight.    Code Status: Full code (son considering DNR/DNI)    PAST MEDICAL & SURGICAL HISTORY  HTN (hypertension)    Cerebrovascular accident (CVA)      SOCIAL HISTORY:  Social History:      ALLERGIES:  No Known Allergies    MEDICATIONS:  STANDING MEDICATIONS  amLODIPine   Tablet 2.5 milliGRAM(s) Oral daily  artificial tears (preservative free) Ophthalmic Solution 1 Drop(s) Both EYES two times a day  aspirin  chewable 81 milliGRAM(s) Oral daily  atorvastatin 40 milliGRAM(s) Oral at bedtime  enalapril 10 milliGRAM(s) Oral daily  enoxaparin Injectable 30 milliGRAM(s) SubCutaneous every 24 hours  metoprolol succinate ER 25 milliGRAM(s) Oral daily  multivitamin/minerals 1 Tablet(s) Oral daily  sodium chloride 0.9%. 1000 milliLiter(s) IV Continuous <Continuous>    PRN MEDICATIONS  acetaminophen     Tablet .. 650 milliGRAM(s) Oral every 6 hours PRN  aluminum hydroxide/magnesium hydroxide/simethicone Suspension 30 milliLiter(s) Oral every 4 hours PRN  melatonin 3 milliGRAM(s) Oral at bedtime PRN  ondansetron Injectable 4 milliGRAM(s) IV Push every 8 hours PRN    VITALS:   T(F): 98  HR: 90  BP: 164/74  RR: 18  SpO2: 97%    PHYSICAL EXAM:   GENERAL: NAD, lying in bed comfortably  HEAD:  Atraumatic, Normocephalic  EYES: EOMI, sclera clear  CHEST/LUNG: Clear to auscultation anteriorly bilaterally; No rales, rhonchi, wheezing, or rubs. Unlabored respirations  HEART: Regular rate and rhythm; No appreciable murmurs, rubs, or gallops  ABDOMEN: Soft, nontender, nondistended  EXTREMITIES: No clubbing, cyanosis, or edema  NERVOUS SYSTEM:  A&Ox2-3, no noted facial droop. Moving all extremities w/o difficulty.   SKIN: No rashes or lesions to b/l forearm, face.     ( - ) Indwelling Weems Catheter:   Date insterted:    Reason (  ) Critical illness     (  ) urinary retention    (  ) Accurate Ins/Outs Monitoring     (  ) CMO patient    ( - ) Central Line:   Date inserted:  Location: (  ) Right IJ     (  ) Left IJ     (  ) Right Fem     (  ) Left Fem    ( - ) SPC        ( - ) pigtail       ( - ) PEG tube       ( - ) colostomy       ( - ) jejunostomy  ( - ) U-Dall    LABS:                        7.5    7.58  )-----------( 356      ( 06 Feb 2024 05:37 )             26.2     02-06    142  |  109  |  29<H>  ----------------------------<  95  4.4   |  27  |  0.6<L>    Ca    8.9      06 Feb 2024 05:37  Mg     1.9     02-06    TPro  5.8<L>  /  Alb  3.5  /  TBili  0.3  /  DBili  x   /  AST  14  /  ALT  9   /  AlkPhos  52  02-06    PT/INR - ( 05 Feb 2024 05:43 )   PT: 11.40 sec;   INR: 1.00 ratio         PTT - ( 05 Feb 2024 05:43 )  PTT:27.8 sec  Urinalysis Basic - ( 06 Feb 2024 05:37 )    Color: x / Appearance: x / SG: x / pH: x  Gluc: 95 mg/dL / Ketone: x  / Bili: x / Urobili: x   Blood: x / Protein: x / Nitrite: x   Leuk Esterase: x / RBC: x / WBC x   Sq Epi: x / Non Sq Epi: x / Bacteria: x            Culture - Urine (collected 04 Feb 2024 17:11)  Source: Clean Catch Clean Catch (Midstream)  Final Report (05 Feb 2024 20:04):    <10,000 CFU/mL Normal Urogenital Maria Guadalupe    Culture - Blood (collected 04 Feb 2024 16:06)  Source: .Blood Blood  Preliminary Report (06 Feb 2024 01:02):    No growth at 24 hours    Culture - Blood (collected 04 Feb 2024 16:06)  Source: .Blood Blood  Preliminary Report (06 Feb 2024 01:02):    No growth at 24 hours      CARDIAC MARKERS ( 04 Feb 2024 21:03 )  x     / x     / x     / x     / 2.1 ng/mL            
  MADDY PAYNE  89y, Female  Allergy: No Known Allergies    Hospital Day: 1d    Patient seen and examined. No acute events overnight    PMH/PSH:  PAST MEDICAL & SURGICAL HISTORY:  HTN (hypertension)      Cerebrovascular accident (CVA)          VITALS:  T(F): 98.1 (02-05-24 @ 07:14), Max: 98.3 (02-05-24 @ 00:58)  HR: 82 (02-05-24 @ 07:14)  BP: 130/65 (02-05-24 @ 07:14) (130/65 - 174/79)  RR: 18 (02-05-24 @ 07:14)  SpO2: 98% (02-05-24 @ 07:14)    TESTS & MEASUREMENTS:  Weight (Kg): 45.4 (02-04-24 @ 14:29)  BMI (kg/m2): 19.5 (02-04)                          8.1    7.24  )-----------( 357      ( 05 Feb 2024 05:43 )             28.8     PT/INR - ( 05 Feb 2024 05:43 )   PT: 11.40 sec;   INR: 1.00 ratio         PTT - ( 05 Feb 2024 05:43 )  PTT:27.8 sec  02-05    143  |  108  |  23<H>  ----------------------------<  86  4.1   |  25  |  0.7    Ca    8.8      05 Feb 2024 05:43  Mg     1.9     02-05    TPro  5.8<L>  /  Alb  3.6  /  TBili  0.3  /  DBili  x   /  AST  15  /  ALT  10  /  AlkPhos  57  02-05    LIVER FUNCTIONS - ( 05 Feb 2024 05:43 )  Alb: 3.6 g/dL / Pro: 5.8 g/dL / ALK PHOS: 57 U/L / ALT: 10 U/L / AST: 15 U/L / GGT: x           CARDIAC MARKERS ( 04 Feb 2024 21:03 )  x     / x     / x     / x     / 2.1 ng/mL        Urinalysis Basic - ( 05 Feb 2024 05:43 )    Color: x / Appearance: x / SG: x / pH: x  Gluc: 86 mg/dL / Ketone: x  / Bili: x / Urobili: x   Blood: x / Protein: x / Nitrite: x   Leuk Esterase: x / RBC: x / WBC x   Sq Epi: x / Non Sq Epi: x / Bacteria: x        RADIOLOGY & ADDITIONAL TESTS:    RECENT DIAGNOSTIC ORDERS:  Magnesium: AM Sched. Collection: 06-Feb-2024 04:30 (02-05-24 @ 10:03)  Comprehensive Metabolic Panel: AM Sched. Collection: 06-Feb-2024 04:30 (02-05-24 @ 10:03)  Complete Blood Count + Automated Diff: AM Sched. Collection: 06-Feb-2024 04:30 (02-05-24 @ 10:03)  VA Duplex Lower Ext Vein Scan, Bilat: 01:50  Exam in Progress (02-05-24 @ 02:13)  MR Head w/wo IV Cont: Routine   Indication: r/o CVA vs. Mets  Transport: Stretcher-Crib  Exam Completed  Provider's Contact #: . (02-05-24 @ 01:54)  Diet, DASH/TLC:   Sodium & Cholesterol Restricted (02-04-24 @ 19:24)  Vitamin B12, Serum: AM Sched. Collection: 05-Feb-2024 04:30 (02-04-24 @ 19:08)  Folate, Serum: AM Sched. Collection: 05-Feb-2024 04:30 (02-04-24 @ 19:08)  Ferritin: AM Sched. Collection: 05-Feb-2024 04:30 (02-04-24 @ 19:08)  12 Lead ECG (02-04-24 @ 19:08)  Culture - Blood: STAT  Specimen Source: Blood (02-04-24 @ 15:22)  Culture - Blood: STAT  Specimen Source: Blood (02-04-24 @ 15:22)  ABO Rh Confirmatory Specimen: STAT  Addl Info: Conditional: ABO Rh Confirmatory Specimen (02-04-24 @ 15:22)  Culture - Urine: Routine  Specimen Source: Clean Catch (Midstream)  Addl Info: If indwelling urinary catheter > 14 days, obtain an order to remove and replace prior to c (02-04-24 @ 15:22)      MEDICATIONS:  MEDICATIONS  (STANDING):  amLODIPine   Tablet 2.5 milliGRAM(s) Oral daily  artificial tears (preservative free) Ophthalmic Solution 1 Drop(s) Both EYES two times a day  enalapril 10 milliGRAM(s) Oral daily  enoxaparin Injectable 30 milliGRAM(s) SubCutaneous every 24 hours  metoprolol succinate ER 25 milliGRAM(s) Oral daily  multivitamin/minerals 1 Tablet(s) Oral daily  sodium chloride 0.9%. 1000 milliLiter(s) (50 mL/Hr) IV Continuous <Continuous>    MEDICATIONS  (PRN):  acetaminophen     Tablet .. 650 milliGRAM(s) Oral every 6 hours PRN Temp greater or equal to 38C (100.4F), Mild Pain (1 - 3)  aluminum hydroxide/magnesium hydroxide/simethicone Suspension 30 milliLiter(s) Oral every 4 hours PRN Dyspepsia  melatonin 3 milliGRAM(s) Oral at bedtime PRN Insomnia  ondansetron Injectable 4 milliGRAM(s) IV Push every 8 hours PRN Nausea and/or Vomiting      HOME MEDICATIONS:  amLODIPine 2.5 mg oral tablet (02-04)  Artificial Tears ophthalmic solution (02-04)  enalapril 10 mg oral tablet (02-04)  metoprolol succinate 25 mg oral capsule, extended release (02-04)  Multiple Vitamins with Minerals oral tablet (02-04)      REVIEW OF SYSTEMS:  All other review of systems is negative unless indicated above.     PHYSICAL EXAM:  PHYSICAL EXAM:  GENERAL: NAD  HEAD:  Atraumatic, Normocephalic  NECK: Supple, No JVD  CHEST/LUNG: Clear to auscultation bilaterally; No wheeze  HEART: Regular rate and rhythm; No murmurs, rubs, or gallops  ABDOMEN: Soft, Nontender, Nondistended; Bowel sounds present  EXTREMITIES:  2+ Peripheral Pulses, No clubbing, cyanosis, or edema  SKIN: No rashes or lesions

## 2024-02-06 NOTE — DISCHARGE NOTE PROVIDER - CARE PROVIDERS DIRECT ADDRESSES
,[email or web address],DirectAddress_Unknown

## 2024-02-06 NOTE — PROGRESS NOTE ADULT - ASSESSMENT
89 yo F with hx of HTN, CVA (no residual deficit), Asymmetric pupils, brought in by Son for intermittent disorientation.     #Intermittent confusion   #Acute/Subacute CVA  #Hx CVA  - CTH reviewed and interpreted: About 1 cm hypodensity associated with the right occipital cortex and adjacent white matter, may reflect subacute to chronic infarction  - MR Head w/wo IV contrast showing small aculte/subacute infarct in inferior right occipital lobe wiht leptomeningeal enhancement; Additional small acute infarcts in the right frontal cortex and in the left ian  - Neuro recs appreciated  - Tele monitoring  - ASA/statin  - f/u Echocardiogram  - check TSH, B12, folate   - check HbA1c and Lipid panel  - PT eval    #Right breast mass   patient reports that she started to notice right breast mass about 2-3 months ago and is getting bigger. Denied fever/pain/tenderness.   - Outpatient f/u, son informed    #Mild LLE swelling  - check duplex LE     #Suspected Dysphagia/Aspiration   - patient reports coughing when drink water.   - S/S eval pending    # HTN   - metoprolol ER 25mg qD  - enalapril 10mg qD    DVT ppx: Lovenox SC  GI ppx: pantoprazole

## 2024-02-06 NOTE — DISCHARGE NOTE PROVIDER - HOSPITAL COURSE
89 y/o F PMHx HTN, CVA no residual defects not on medications, asymmetric pupils, was brought in by son for evaluation.  Per son, patient has been confused intermittently for the past 2 days, as patient would ask repeatedly what the date is.  Also endorses general weakness and almost fell multiple times at home.  States that patient has been treated for UTI multiple times for the past several months.  Denies fever, shortness breath, chest pain, nausea, vomiting, abdominal pain, and urinary symptoms.    CTH No Cont showed a 1 cm hypodensity associated with the right occipital cortex and adjacent white matter.   MR Head w/wo IV contrast showing small acute/subacute infarct in inferior right occipital lobe wiht leptomeningeal enhancement; Additional small acute infarcts in the right frontal cortex and in the left ian.    Patient was seen by neurology and started on ASA and statin. Echo showed .....    Patient is medically stable and ready for discharge.   89 y/o F PMHx HTN, CVA no residual defects not on medications, asymmetric pupils, was brought in by son for evaluation.  Per son, patient has been confused intermittently for the past 2 days, as patient would ask repeatedly what the date is.  Also endorses general weakness and almost fell multiple times at home.  States that patient has been treated for UTI multiple times for the past several months.  Denies fever, shortness breath, chest pain, nausea, vomiting, abdominal pain, and urinary symptoms.    CTH No Cont showed a 1 cm hypodensity associated with the right occipital cortex and adjacent white matter.   MR Head w/wo IV contrast showing small acute/subacute infarct in inferior right occipital lobe with leptomeningeal enhancement; Additional small acute infarcts in the right frontal cortex and in the left ian.    Patient was seen by neurology and started on ASA and statin. Echo showed .....    Patient is medically stable and ready for discharge.    A/P:   Acute Bilateral Stroke  MRI showed small acute/subacute  in inferior right occipital lobe with leptomeningeal enhancement; Additional small acute infarcts in the right frontal cortex and in the left ian.  Neurology recommended ASA and Plavix for 21 days then ASA alone, continue Lipitor. 89 y/o F PMHx HTN, CVA no residual defects not on medications, asymmetric pupils, was brought in by son for evaluation.  Per son, patient has been confused intermittently for the past 2 days, as patient would ask repeatedly what the date is.  Also endorses general weakness and almost fell multiple times at home.  States that patient has been treated for UTI multiple times for the past several months.  Denies fever, shortness breath, chest pain, nausea, vomiting, abdominal pain, and urinary symptoms.    CTH No Cont showed a 1 cm hypodensity associated with the right occipital cortex and adjacent white matter.   MR Head w/wo IV contrast showing small acute/subacute infarct in inferior right occipital lobe with leptomeningeal enhancement; Additional small acute infarcts in the right frontal cortex and in the left ian.    Patient was seen by neurology and started on ASA and statin. Echo showed Normal left ventricular internal cavity size. Normal global left ventricular systolic function. LV Ejection Fraction by García's Method with a biplane EF of 57 %.    Patient is medically stable and ready for discharge.    A/P:   Acute Bilateral Stroke  MRI showed small acute/subacute  in inferior right occipital lobe with leptomeningeal enhancement; Additional small acute infarcts in the right frontal cortex and in the left ian.  Neurology recommended ASA and Plavix for 21 days then ASA alone, continue Lipitor.

## 2024-02-06 NOTE — DISCHARGE NOTE PROVIDER - CARE PROVIDER_API CALL
KRISTIN DEE, Phys,    Phone: ()-  Fax: ()-  Follow Up Time: 2 weeks   KRISTIN DEE Phys,    Phone: ()-  Fax: ()-  Follow Up Time: 2 weeks    George Garcia  Urology  [address] [place] [id number]  Phone: [phone]  Fax: [phone]  Follow Up Time: 1 week

## 2024-02-06 NOTE — DISCHARGE NOTE NURSING/CASE MANAGEMENT/SOCIAL WORK - NSDCPEPT PROEDMA_GEN_ALL_CORE
Pt, daughter and Juan Alberto Sport verbalized understanding of discharge and follow up instructions, along with prescriptions  Denies additional questioning  Stable upon leaving ed Yes

## 2024-02-06 NOTE — DISCHARGE NOTE PROVIDER - NSDCMRMEDTOKEN_GEN_ALL_CORE_FT
amLODIPine 2.5 mg oral tablet: 1 tab(s) orally once a day  Artificial Tears ophthalmic solution: 1 drop(s) in each eye 2 times a day  aspirin 81 mg oral tablet, chewable: 1 tab(s) orally once a day  atorvastatin 40 mg oral tablet: 1 tab(s) orally once a day (at bedtime)  enalapril 10 mg oral tablet: 1 tab(s) orally once a day  metoprolol succinate 25 mg oral capsule, extended release: 1 cap(s) orally once a day  Multiple Vitamins with Minerals oral tablet: 1 tab(s) orally once a day  pantoprazole 40 mg oral delayed release tablet: 1 tab(s) orally once a day (before a meal)   amLODIPine 2.5 mg oral tablet: 1 tab(s) orally once a day  Artificial Tears ophthalmic solution: 1 drop(s) in each eye 2 times a day  aspirin 81 mg oral tablet, chewable: 1 tab(s) orally once a day  atorvastatin 40 mg oral tablet: 1 tab(s) orally once a day (at bedtime)  enalapril 10 mg oral tablet: 1 tab(s) orally once a day  metoprolol succinate 25 mg oral capsule, extended release: 1 cap(s) orally once a day  Multiple Vitamins with Minerals oral tablet: 1 tab(s) orally once a day  pantoprazole 40 mg oral delayed release tablet: 1 tab(s) orally once a day (before a meal)  Plavix 75 mg oral tablet: 1 tab(s) orally once a day take the Plavix for 21 days then stop it

## 2024-02-10 LAB
CULTURE RESULTS: SIGNIFICANT CHANGE UP
CULTURE RESULTS: SIGNIFICANT CHANGE UP
SPECIMEN SOURCE: SIGNIFICANT CHANGE UP
SPECIMEN SOURCE: SIGNIFICANT CHANGE UP

## 2024-02-13 DIAGNOSIS — R29.701 NIHSS SCORE 1: ICD-10-CM

## 2024-02-13 DIAGNOSIS — R13.10 DYSPHAGIA, UNSPECIFIED: ICD-10-CM

## 2024-02-13 DIAGNOSIS — I10 ESSENTIAL (PRIMARY) HYPERTENSION: ICD-10-CM

## 2024-02-13 DIAGNOSIS — I63.9 CEREBRAL INFARCTION, UNSPECIFIED: ICD-10-CM

## 2024-02-13 DIAGNOSIS — M79.89 OTHER SPECIFIED SOFT TISSUE DISORDERS: ICD-10-CM

## 2024-02-13 DIAGNOSIS — D64.9 ANEMIA, UNSPECIFIED: ICD-10-CM

## 2024-02-13 DIAGNOSIS — N63.10 UNSPECIFIED LUMP IN THE RIGHT BREAST, UNSPECIFIED QUADRANT: ICD-10-CM

## 2024-03-12 NOTE — ED ADULT NURSE NOTE - NS ED NURSE DISCH DISPOSITION
Conveyed results.  Patient states the elevated numbers are very concerning to her.  Pt does not believe it is from her smoking cigarettes as she feels she does not somke enough to make her sick.  Pt smokes half a pack or just a little less per day.  Per pt \"I am sick of this bull shit.  I am smoking a few and that does not make me sick\". Writer did advise that this amount can affect blood count.  Pt declines.    Pt states she eats healthy, exercises, takes vitamins, goes outside.  She is not understanding her low vitamin D level.  Writer discussed ensuring adequate calcium (800-1000 mg in diet) and vitamin D 2000 IU daily.  Pt to hold otc vitamin D while taking prescription.  Writer unalbe to advise why her vitamin D is low if she is supplementing and getting sun.    Pt states she does not feel like this is a normal autoimmune flare unless her autoimmune is worsening.  Pt states she is so cold that she is wrapping herself in two robes.  Pt also notes feeling worse since appointment as she lifted something at home, pain in kidney shifted causing her to jerk and hurt lower back and foot.    Pt will message her Rheumatologist.    Patient wants to know what else she can do and why else her labs can be off other than smoking. Writer unable to advise patient further.  Please advise.   Admitted